# Patient Record
Sex: FEMALE | Race: OTHER | NOT HISPANIC OR LATINO | URBAN - METROPOLITAN AREA
[De-identification: names, ages, dates, MRNs, and addresses within clinical notes are randomized per-mention and may not be internally consistent; named-entity substitution may affect disease eponyms.]

---

## 2018-01-24 ENCOUNTER — EMERGENCY (EMERGENCY)
Facility: HOSPITAL | Age: 33
LOS: 1 days | Discharge: HOME | End: 2018-01-24
Attending: EMERGENCY MEDICINE
Payer: SELF-PAY

## 2018-01-24 VITALS
HEART RATE: 83 BPM | RESPIRATION RATE: 18 BRPM | SYSTOLIC BLOOD PRESSURE: 127 MMHG | TEMPERATURE: 98 F | OXYGEN SATURATION: 98 % | DIASTOLIC BLOOD PRESSURE: 82 MMHG

## 2018-01-24 VITALS
DIASTOLIC BLOOD PRESSURE: 79 MMHG | TEMPERATURE: 98 F | RESPIRATION RATE: 18 BRPM | SYSTOLIC BLOOD PRESSURE: 121 MMHG | HEART RATE: 77 BPM | OXYGEN SATURATION: 99 %

## 2018-01-24 PROCEDURE — 99282 EMERGENCY DEPT VISIT SF MDM: CPT

## 2018-01-24 PROCEDURE — 73130 X-RAY EXAM OF HAND: CPT | Mod: 26,RT

## 2018-01-24 PROCEDURE — 73110 X-RAY EXAM OF WRIST: CPT | Mod: 26,RT

## 2018-01-24 PROCEDURE — 73090 X-RAY EXAM OF FOREARM: CPT

## 2018-01-24 PROCEDURE — 73130 X-RAY EXAM OF HAND: CPT

## 2018-01-24 PROCEDURE — 73080 X-RAY EXAM OF ELBOW: CPT | Mod: 26,RT

## 2018-01-24 PROCEDURE — 73090 X-RAY EXAM OF FOREARM: CPT | Mod: 26,LT

## 2018-01-24 PROCEDURE — 73080 X-RAY EXAM OF ELBOW: CPT

## 2018-01-24 PROCEDURE — 99284 EMERGENCY DEPT VISIT MOD MDM: CPT | Mod: 25

## 2018-01-24 PROCEDURE — 73110 X-RAY EXAM OF WRIST: CPT

## 2018-01-24 RX ORDER — OXYCODONE HYDROCHLORIDE 5 MG/1
5 TABLET ORAL ONCE
Qty: 0 | Refills: 0 | Status: DISCONTINUED | OUTPATIENT
Start: 2018-01-24 | End: 2018-01-24

## 2018-01-24 RX ADMIN — OXYCODONE HYDROCHLORIDE 5 MILLIGRAM(S): 5 TABLET ORAL at 17:19

## 2018-01-24 RX ADMIN — OXYCODONE HYDROCHLORIDE 5 MILLIGRAM(S): 5 TABLET ORAL at 15:40

## 2018-01-24 NOTE — ED PROVIDER NOTE - CONDUCTED A DETAILED DISCUSSION WITH PATIENT AND/OR GUARDIAN REGARDING, MDM
radiology results/need for outpatient follow-up radiology results/need for outpatient follow-up/return to ED if symptoms worsen, persist or questions arise

## 2018-01-24 NOTE — ED PROVIDER NOTE - PROGRESS NOTE DETAILS
Xray show no fracture or dislocation. Will apply velcro splint and dc with ortho follow up through care coordinator. Advised RICE and Ibuprofen for pain.  Pt is well appearing walking with steady gait, stable for discharge and follow up without fail with medical doctor. I had a detailed discussion with the patient and/or guardian regarding the historical points, exam findings, and any diagnostic results supporting the discharge diagnosis. Pt educated on care and need for follow up. Strict return instructions and red flag signs and symptoms discussed with patient. Questions answered. Pt shows understanding of discharge information and agrees to follow.

## 2018-01-24 NOTE — ED PROVIDER NOTE - MEDICAL DECISION MAKING DETAILS
34 y/o F pt presents with R hand pain and R elbow pain. Pt cannot tolerate to be examined; will re-examine after Oxycodone. Plan for X-Rays of R hand, R wrist, and R elbow. Plan for symptomatic treatment, and reassess. May need R hand surgery.
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
## 2019-12-16 NOTE — ED PROVIDER NOTE - CPE EDP GU CVA TENDER
Detail Level: Simple
Quality 130: Documentation Of Current Medications In The Medical Record: Current Medications Documented
Quality 131: Pain Assessment And Follow-Up: Pain assessment using a standardized tool is documented as negative, no follow-up plan required
Additional Notes: Pain 0/10
no tenderness

## 2024-06-10 NOTE — ED ADULT NURSE NOTE - NS ED NURSE LEVEL OF CONSCIOUSNESS AFFECT
[Moderate acute suicide risk] : Moderate acute suicide risk [Yes] : Safety Plan completed/updated (for individuals at risk): Yes [FreeTextEntry1] : Moderate RIsk: Despite Risk factors of  SI, depression, insomnia, and family history of suicide, patient endorses several Protective factors including no history of actual suicide attempts, no history of self-injurious behavior, no substance use, has reasons for living, interested in therapy, no history of panic attacks, no history of impulsivity, strong family/social support, domiciled, no caridad, no psychosis, no psychiatric hospitalization, current willingness to engage in treatment, participation in safety planning, future orientation, hopeful, help-seeking, engaged in school & activities, current denial of any suicidal intent/plan or urges to self-harm, no aggression/violence, no access to guns, family is able to means restrict, no legal history   Calm

## 2025-07-01 NOTE — ED PROVIDER NOTE - UPPER EXTREMITY EXAM, RIGHT
No
tenderness to R elbow, cannot pronate/supinate; tenderness to R scaphoid; trigger fingers to the distal 3 R fingers